# Patient Record
Sex: MALE | Race: WHITE | NOT HISPANIC OR LATINO | Employment: OTHER | URBAN - METROPOLITAN AREA
[De-identification: names, ages, dates, MRNs, and addresses within clinical notes are randomized per-mention and may not be internally consistent; named-entity substitution may affect disease eponyms.]

---

## 2024-03-23 ENCOUNTER — APPOINTMENT (OUTPATIENT)
Dept: RADIOLOGY | Facility: HOSPITAL | Age: 59
End: 2024-03-23
Payer: MEDICAID

## 2024-03-23 ENCOUNTER — HOSPITAL ENCOUNTER (EMERGENCY)
Facility: HOSPITAL | Age: 59
Discharge: HOME | End: 2024-03-23
Payer: MEDICAID

## 2024-03-23 VITALS
TEMPERATURE: 98.6 F | RESPIRATION RATE: 16 BRPM | HEART RATE: 94 BPM | SYSTOLIC BLOOD PRESSURE: 127 MMHG | OXYGEN SATURATION: 96 % | DIASTOLIC BLOOD PRESSURE: 81 MMHG

## 2024-03-23 DIAGNOSIS — R10.13 EPIGASTRIC PAIN: Primary | ICD-10-CM

## 2024-03-23 DIAGNOSIS — K29.00 ACUTE GASTRITIS WITHOUT HEMORRHAGE, UNSPECIFIED GASTRITIS TYPE: ICD-10-CM

## 2024-03-23 LAB
ALBUMIN SERPL-MCNC: 4.5 G/DL (ref 3.5–5)
ALP BLD-CCNC: 71 U/L (ref 35–125)
ALT SERPL-CCNC: 85 U/L (ref 5–40)
ANION GAP SERPL CALC-SCNC: 17 MMOL/L
APPEARANCE UR: CLEAR
AST SERPL-CCNC: 48 U/L (ref 5–40)
BASOPHILS # BLD AUTO: 0.08 X10*3/UL (ref 0–0.1)
BASOPHILS NFR BLD AUTO: 0.6 %
BILIRUB SERPL-MCNC: 0.7 MG/DL (ref 0.1–1.2)
BILIRUB UR STRIP.AUTO-MCNC: NEGATIVE MG/DL
BUN SERPL-MCNC: 12 MG/DL (ref 8–25)
CALCIUM SERPL-MCNC: 9.5 MG/DL (ref 8.5–10.4)
CHLORIDE SERPL-SCNC: 99 MMOL/L (ref 97–107)
CO2 SERPL-SCNC: 23 MMOL/L (ref 24–31)
COLOR UR: YELLOW
CREAT SERPL-MCNC: 1 MG/DL (ref 0.4–1.6)
EGFRCR SERPLBLD CKD-EPI 2021: 88 ML/MIN/1.73M*2
EOSINOPHIL # BLD AUTO: 0.12 X10*3/UL (ref 0–0.7)
EOSINOPHIL NFR BLD AUTO: 1 %
ERYTHROCYTE [DISTWIDTH] IN BLOOD BY AUTOMATED COUNT: 13.6 % (ref 11.5–14.5)
GLUCOSE SERPL-MCNC: 115 MG/DL (ref 65–99)
GLUCOSE UR STRIP.AUTO-MCNC: NORMAL MG/DL
HCT VFR BLD AUTO: 46.9 % (ref 41–52)
HGB BLD-MCNC: 16.4 G/DL (ref 13.5–17.5)
IMM GRANULOCYTES # BLD AUTO: 0.04 X10*3/UL (ref 0–0.7)
IMM GRANULOCYTES NFR BLD AUTO: 0.3 % (ref 0–0.9)
KETONES UR STRIP.AUTO-MCNC: ABNORMAL MG/DL
LEUKOCYTE ESTERASE UR QL STRIP.AUTO: NEGATIVE
LIPASE SERPL-CCNC: 31 U/L (ref 16–63)
LYMPHOCYTES # BLD AUTO: 3.84 X10*3/UL (ref 1.2–4.8)
LYMPHOCYTES NFR BLD AUTO: 30.5 %
MCH RBC QN AUTO: 31.9 PG (ref 26–34)
MCHC RBC AUTO-ENTMCNC: 35 G/DL (ref 32–36)
MCV RBC AUTO: 91 FL (ref 80–100)
MONOCYTES # BLD AUTO: 0.93 X10*3/UL (ref 0.1–1)
MONOCYTES NFR BLD AUTO: 7.4 %
MUCOUS THREADS #/AREA URNS AUTO: NORMAL /LPF
NEUTROPHILS # BLD AUTO: 7.6 X10*3/UL (ref 1.2–7.7)
NEUTROPHILS NFR BLD AUTO: 60.2 %
NITRITE UR QL STRIP.AUTO: NEGATIVE
NRBC BLD-RTO: 0 /100 WBCS (ref 0–0)
PH UR STRIP.AUTO: 6.5 [PH]
PLATELET # BLD AUTO: 314 X10*3/UL (ref 150–450)
POTASSIUM SERPL-SCNC: 4.2 MMOL/L (ref 3.4–5.1)
PROT SERPL-MCNC: 8 G/DL (ref 5.9–7.9)
PROT UR STRIP.AUTO-MCNC: ABNORMAL MG/DL
RBC # BLD AUTO: 5.14 X10*6/UL (ref 4.5–5.9)
RBC # UR STRIP.AUTO: NEGATIVE /UL
RBC #/AREA URNS AUTO: NORMAL /HPF
SODIUM SERPL-SCNC: 139 MMOL/L (ref 133–145)
SP GR UR STRIP.AUTO: 1.02
UROBILINOGEN UR STRIP.AUTO-MCNC: NORMAL MG/DL
WBC # BLD AUTO: 12.6 X10*3/UL (ref 4.4–11.3)
WBC #/AREA URNS AUTO: NORMAL /HPF

## 2024-03-23 PROCEDURE — 2550000001 HC RX 255 CONTRASTS: Performed by: PHYSICIAN ASSISTANT

## 2024-03-23 PROCEDURE — 2500000001 HC RX 250 WO HCPCS SELF ADMINISTERED DRUGS (ALT 637 FOR MEDICARE OP): Performed by: PHYSICIAN ASSISTANT

## 2024-03-23 PROCEDURE — 76705 ECHO EXAM OF ABDOMEN: CPT

## 2024-03-23 PROCEDURE — 83690 ASSAY OF LIPASE: CPT | Performed by: PHYSICIAN ASSISTANT

## 2024-03-23 PROCEDURE — 74177 CT ABD & PELVIS W/CONTRAST: CPT | Performed by: RADIOLOGY

## 2024-03-23 PROCEDURE — 80048 BASIC METABOLIC PNL TOTAL CA: CPT | Performed by: PHYSICIAN ASSISTANT

## 2024-03-23 PROCEDURE — 74177 CT ABD & PELVIS W/CONTRAST: CPT

## 2024-03-23 PROCEDURE — 2500000004 HC RX 250 GENERAL PHARMACY W/ HCPCS (ALT 636 FOR OP/ED): Performed by: PHYSICIAN ASSISTANT

## 2024-03-23 PROCEDURE — 85025 COMPLETE CBC W/AUTO DIFF WBC: CPT | Performed by: PHYSICIAN ASSISTANT

## 2024-03-23 PROCEDURE — 81001 URINALYSIS AUTO W/SCOPE: CPT | Performed by: PHYSICIAN ASSISTANT

## 2024-03-23 PROCEDURE — 96374 THER/PROPH/DIAG INJ IV PUSH: CPT | Mod: 59

## 2024-03-23 PROCEDURE — 96375 TX/PRO/DX INJ NEW DRUG ADDON: CPT

## 2024-03-23 PROCEDURE — 36415 COLL VENOUS BLD VENIPUNCTURE: CPT | Performed by: PHYSICIAN ASSISTANT

## 2024-03-23 PROCEDURE — 99285 EMERGENCY DEPT VISIT HI MDM: CPT | Mod: 25

## 2024-03-23 PROCEDURE — 76705 ECHO EXAM OF ABDOMEN: CPT | Performed by: RADIOLOGY

## 2024-03-23 RX ORDER — FAMOTIDINE 20 MG/1
20 TABLET, FILM COATED ORAL 2 TIMES DAILY
Qty: 10 TABLET | Refills: 0 | Status: SHIPPED | OUTPATIENT
Start: 2024-03-23 | End: 2024-03-28

## 2024-03-23 RX ORDER — FAMOTIDINE 10 MG/ML
20 INJECTION INTRAVENOUS ONCE
Status: COMPLETED | OUTPATIENT
Start: 2024-03-23 | End: 2024-03-23

## 2024-03-23 RX ORDER — ACETAMINOPHEN 325 MG/1
975 TABLET ORAL ONCE
Status: COMPLETED | OUTPATIENT
Start: 2024-03-23 | End: 2024-03-23

## 2024-03-23 RX ORDER — PANTOPRAZOLE SODIUM 40 MG/1
40 TABLET, DELAYED RELEASE ORAL ONCE
Status: COMPLETED | OUTPATIENT
Start: 2024-03-23 | End: 2024-03-23

## 2024-03-23 RX ORDER — OMEPRAZOLE 40 MG/1
40 CAPSULE, DELAYED RELEASE ORAL DAILY
Qty: 30 CAPSULE | Refills: 0 | Status: SHIPPED | OUTPATIENT
Start: 2024-03-23 | End: 2024-04-22

## 2024-03-23 RX ORDER — KETOROLAC TROMETHAMINE 30 MG/ML
30 INJECTION, SOLUTION INTRAMUSCULAR; INTRAVENOUS ONCE
Status: COMPLETED | OUTPATIENT
Start: 2024-03-23 | End: 2024-03-23

## 2024-03-23 RX ADMIN — KETOROLAC TROMETHAMINE 30 MG: 30 INJECTION, SOLUTION INTRAMUSCULAR at 14:50

## 2024-03-23 RX ADMIN — FAMOTIDINE 20 MG: 10 INJECTION, SOLUTION INTRAVENOUS at 14:50

## 2024-03-23 RX ADMIN — SODIUM CHLORIDE 1000 ML: 900 INJECTION, SOLUTION INTRAVENOUS at 14:46

## 2024-03-23 RX ADMIN — IOHEXOL 75 ML: 350 INJECTION, SOLUTION INTRAVENOUS at 15:27

## 2024-03-23 RX ADMIN — ACETAMINOPHEN 975 MG: 325 TABLET ORAL at 18:24

## 2024-03-23 RX ADMIN — PANTOPRAZOLE SODIUM 40 MG: 40 TABLET, DELAYED RELEASE ORAL at 18:24

## 2024-03-23 ASSESSMENT — LIFESTYLE VARIABLES
EVER FELT BAD OR GUILTY ABOUT YOUR DRINKING: NO
TOTAL SCORE: 0
EVER HAD A DRINK FIRST THING IN THE MORNING TO STEADY YOUR NERVES TO GET RID OF A HANGOVER: NO
HAVE PEOPLE ANNOYED YOU BY CRITICIZING YOUR DRINKING: NO
HAVE YOU EVER FELT YOU SHOULD CUT DOWN ON YOUR DRINKING: NO

## 2024-03-23 NOTE — ED TRIAGE NOTES
Pt came in for ruq pain x2 week.  And it radiates to his lower back. Pt has hx of neuropathy.   Pt states he has not eaten in 7 days, and has not had normal bms in 6/7 days. Pt denies n/v.  Pt is still urinating, but complaining of weakness.

## 2024-03-23 NOTE — ED PROVIDER NOTES
HPI   Chief Complaint   Patient presents with    Abdominal Pain       58-year-old male presented emergency department with a chief complaint of several weeks of an epigastric discomfort, right upper quadrant pain.  Occasionally will radiate around the right side of his back.  He states it is painful to eat and drink.  He has been taking ibuprofen daily for the last several weeks.  He is not on any acid reduction medicine.  He denies prior abdominal surgery.  He denies chest pain or shortness of breath.  He is well-appearing nontoxic afebrile.  He has some decreased urination.  He is having bowel movements.                          No data recorded                   Patient History   No past medical history on file.  No past surgical history on file.  No family history on file.  Social History     Tobacco Use    Smoking status: Not on file    Smokeless tobacco: Not on file   Substance Use Topics    Alcohol use: Not on file    Drug use: Not on file       Physical Exam   ED Triage Vitals [03/23/24 1432]   Temperature Heart Rate Respirations BP   36.2 °C (97.2 °F) 84 19 146/89      Pulse Ox Temp Source Heart Rate Source Patient Position   99 % Temporal Monitor Sitting      BP Location FiO2 (%)     Left arm --       Physical Exam  Vitals reviewed.   Constitutional:       Appearance: He is well-developed.   HENT:      Head: Normocephalic.   Cardiovascular:      Rate and Rhythm: Normal rate and regular rhythm.   Pulmonary:      Effort: Pulmonary effort is normal.   Abdominal:      Palpations: Abdomen is soft.      Comments: Mild epigastric tenderness, soft no rebound or guarding   Skin:     General: Skin is warm.   Neurological:      General: No focal deficit present.      Mental Status: He is alert.   Psychiatric:         Mood and Affect: Mood normal.         Behavior: Behavior normal.         ED Course & MDM   Diagnoses as of 03/23/24 1814   Epigastric pain   Acute gastritis without hemorrhage, unspecified gastritis type        Medical Decision Making  I have seen and evaluated this patient.  Physician available for consultation.  Vital signs have been reviewed.  All laboratory and diagnostic imaging is reviewed by myself and interpreted by myself unless otherwise stated.  Additionally imaging is interpreted by radiologist.    CBC without significant leukocytosis or anemia, metabolic panel without significant renal impairment or electrolyte abnormality.  Patient with minimal elevation of liver function test.  CT scan essentially unremarkable.  Due to his white count of 12,000 and abnormal LFTs ultrasound is obtained which shows fatty liver disease but otherwise unremarkable.  Patient has complete resolution of his symptoms after IV Pepcid.  Given his ibuprofen use and improvement of symptoms with Pepcid and lack of finding on imaging studies I highly suspect that the patient has gastritis of some degree.  Will start on omeprazole and a short course of Pepcid with gastroenterology referral.  Released in good condition.    Labs Reviewed  CBC WITH AUTO DIFFERENTIAL - Abnormal     WBC                           12.6 (*)               nRBC                          0.0                    RBC                           5.14                   Hemoglobin                    16.4                   Hematocrit                    46.9                   MCV                           91                     MCH                           31.9                   MCHC                          35.0                   RDW                           13.6                   Platelets                     314                    Neutrophils %                 60.2                   Immature Granulocytes %, Automated   0.3                    Lymphocytes %                 30.5                   Monocytes %                   7.4                    Eosinophils %                 1.0                    Basophils %                   0.6                    Neutrophils  Absolute          7.60                   Immature Granulocytes Absolute, Au*   0.04                   Lymphocytes Absolute          3.84                   Monocytes Absolute            0.93                   Eosinophils Absolute          0.12                   Basophils Absolute            0.08                COMPREHENSIVE METABOLIC PANEL - Abnormal     Glucose                       115 (*)                Sodium                        139                    Potassium                     4.2                    Chloride                      99                     Bicarbonate                   23 (*)                 Urea Nitrogen                 12                     Creatinine                    1.00                   eGFR                          88                     Calcium                       9.5                    Albumin                       4.5                    Alkaline Phosphatase          71                     Total Protein                 8.0 (*)                AST                           48 (*)                 Bilirubin, Total              0.7                    ALT                           85 (*)                 Anion Gap                     17                  URINALYSIS WITH REFLEX CULTURE AND MICROSCOPIC - Abnormal     Color, Urine                  Yellow                 Appearance, Urine             Clear                  Specific Gravity, Urine       1.025                  pH, Urine                     6.5                    Protein, Urine                20 (TRACE)                Glucose, Urine                Normal                 Blood, Urine                  NEGATIVE                Ketones, Urine                20 (1+) (*)               Bilirubin, Urine              NEGATIVE                Urobilinogen, Urine           Normal                 Nitrite, Urine                NEGATIVE                Leukocyte Esterase, Urine     NEGATIVE             LIPASE - Normal     Lipase                         31                  URINALYSIS WITH REFLEX CULTURE AND MICROSCOPIC       Narrative: The following orders were created for panel order Urinalysis with Reflex Culture and Microscopic.                Procedure                               Abnormality         Status                                   ---------                               -----------         ------                                   Urinalysis with Reflex C...[[id number]]  Abnormal            Final result                             Extra Urine Gray Tube[[id number]]                            In process                                               Please view results for these tests on the individual orders.  EXTRA URINE GRAY TUBE  URINALYSIS MICROSCOPIC WITH REFLEX CULTURE  US right upper quadrant   Final Result    Diffuse increased hepatic echogenicity suggesting hepatic steatosis.    Correlate with hepatic function studies.                Otherwise unremarkable right upper quadrant ultrasound          Signed by: Diana Stockton 3/23/2024 5:55 PM    Dictation workstation:   [id number]     CT abdomen pelvis w IV contrast   Final Result    1. No acute abnormality within the abdomen or pelvis.          MACRO:    None          Signed by: Carlos Xie 3/23/2024 4:03 PM    Dictation workstation:   [id number]     Medications  famotidine PF (Pepcid) injection 20 mg (20 mg intravenous Given 3/23/24 1450)  ketorolac (Toradol) injection 30 mg (30 mg intravenous Given 3/23/24 1450)  sodium chloride 0.9 % bolus 1,000 mL (0 mL intravenous Stopped 3/23/24 1516)   iohexol (OMNIPaque) 350 mg iodine/mL solution 75 mL (75 mL intravenous Given 3/23/24 1527)  New Prescriptions  FAMOTIDINE (PEPCID) 20 MG TABLET     Take 1 tablet (20 mg) by mouth 2 times a day for 10 doses.  OMEPRAZOLE (PRILOSEC) 40 MG DR CAPSULE     Take 1 capsule (40 mg) by mouth once daily. Do not crush or chew.            Procedure  Procedures     Jaime Hong PA-C  03/23/24 7514

## 2024-03-24 LAB — HOLD SPECIMEN: NORMAL

## 2025-04-01 ENCOUNTER — HOSPITAL ENCOUNTER (EMERGENCY)
Facility: HOSPITAL | Age: 60
Discharge: HOME | End: 2025-04-01
Payer: MEDICARE

## 2025-04-01 ENCOUNTER — APPOINTMENT (OUTPATIENT)
Dept: RADIOLOGY | Facility: HOSPITAL | Age: 60
End: 2025-04-01
Payer: MEDICARE

## 2025-04-01 VITALS
RESPIRATION RATE: 18 BRPM | TEMPERATURE: 97.7 F | HEART RATE: 65 BPM | OXYGEN SATURATION: 97 % | SYSTOLIC BLOOD PRESSURE: 131 MMHG | HEIGHT: 78 IN | WEIGHT: 315 LBS | BODY MASS INDEX: 36.45 KG/M2 | DIASTOLIC BLOOD PRESSURE: 87 MMHG

## 2025-04-01 DIAGNOSIS — M54.50 CHRONIC BILATERAL LOW BACK PAIN WITHOUT SCIATICA: Primary | ICD-10-CM

## 2025-04-01 DIAGNOSIS — G89.29 CHRONIC BILATERAL LOW BACK PAIN WITHOUT SCIATICA: Primary | ICD-10-CM

## 2025-04-01 LAB
ALBUMIN SERPL BCP-MCNC: 4.3 G/DL (ref 3.4–5)
ALP SERPL-CCNC: 48 U/L (ref 33–120)
ALT SERPL W P-5'-P-CCNC: 24 U/L (ref 10–52)
ANION GAP SERPL CALCULATED.3IONS-SCNC: 15 MMOL/L (ref 10–20)
APPEARANCE UR: CLEAR
AST SERPL W P-5'-P-CCNC: 21 U/L (ref 9–39)
BASOPHILS # BLD AUTO: 0.04 X10*3/UL (ref 0–0.1)
BASOPHILS NFR BLD AUTO: 0.4 %
BILIRUB SERPL-MCNC: 0.5 MG/DL (ref 0–1.2)
BILIRUB UR STRIP.AUTO-MCNC: NEGATIVE MG/DL
BUN SERPL-MCNC: 11 MG/DL (ref 6–23)
CALCIUM SERPL-MCNC: 8.9 MG/DL (ref 8.6–10.3)
CARDIAC TROPONIN I PNL SERPL HS: 4 NG/L (ref 0–20)
CHLORIDE SERPL-SCNC: 104 MMOL/L (ref 98–107)
CO2 SERPL-SCNC: 24 MMOL/L (ref 21–32)
COLOR UR: YELLOW
CREAT SERPL-MCNC: 1.02 MG/DL (ref 0.5–1.3)
EGFRCR SERPLBLD CKD-EPI 2021: 85 ML/MIN/1.73M*2
EOSINOPHIL # BLD AUTO: 0.18 X10*3/UL (ref 0–0.7)
EOSINOPHIL NFR BLD AUTO: 1.9 %
ERYTHROCYTE [DISTWIDTH] IN BLOOD BY AUTOMATED COUNT: 13.5 % (ref 11.5–14.5)
GLUCOSE SERPL-MCNC: 130 MG/DL (ref 74–99)
GLUCOSE UR STRIP.AUTO-MCNC: NORMAL MG/DL
HCT VFR BLD AUTO: 43.1 % (ref 41–52)
HGB BLD-MCNC: 14.8 G/DL (ref 13.5–17.5)
IMM GRANULOCYTES # BLD AUTO: 0.02 X10*3/UL (ref 0–0.7)
IMM GRANULOCYTES NFR BLD AUTO: 0.2 % (ref 0–0.9)
KETONES UR STRIP.AUTO-MCNC: NEGATIVE MG/DL
LEUKOCYTE ESTERASE UR QL STRIP.AUTO: NEGATIVE
LIPASE SERPL-CCNC: 27 U/L (ref 9–82)
LYMPHOCYTES # BLD AUTO: 3.21 X10*3/UL (ref 1.2–4.8)
LYMPHOCYTES NFR BLD AUTO: 33.5 %
MCH RBC QN AUTO: 30.6 PG (ref 26–34)
MCHC RBC AUTO-ENTMCNC: 34.3 G/DL (ref 32–36)
MCV RBC AUTO: 89 FL (ref 80–100)
MONOCYTES # BLD AUTO: 0.85 X10*3/UL (ref 0.1–1)
MONOCYTES NFR BLD AUTO: 8.9 %
MUCOUS THREADS #/AREA URNS AUTO: NORMAL /LPF
NEUTROPHILS # BLD AUTO: 5.29 X10*3/UL (ref 1.2–7.7)
NEUTROPHILS NFR BLD AUTO: 55.1 %
NITRITE UR QL STRIP.AUTO: NEGATIVE
NRBC BLD-RTO: 0 /100 WBCS (ref 0–0)
PH UR STRIP.AUTO: 5.5 [PH]
PLATELET # BLD AUTO: 269 X10*3/UL (ref 150–450)
POTASSIUM SERPL-SCNC: 3.5 MMOL/L (ref 3.5–5.3)
PROT SERPL-MCNC: 7.4 G/DL (ref 6.4–8.2)
PROT UR STRIP.AUTO-MCNC: NORMAL MG/DL
RBC # BLD AUTO: 4.84 X10*6/UL (ref 4.5–5.9)
RBC # UR STRIP.AUTO: NEGATIVE MG/DL
RBC #/AREA URNS AUTO: NORMAL /HPF
SODIUM SERPL-SCNC: 139 MMOL/L (ref 136–145)
SP GR UR STRIP.AUTO: 1.03
UROBILINOGEN UR STRIP.AUTO-MCNC: NORMAL MG/DL
WBC # BLD AUTO: 9.6 X10*3/UL (ref 4.4–11.3)
WBC #/AREA URNS AUTO: NORMAL /HPF

## 2025-04-01 PROCEDURE — 83690 ASSAY OF LIPASE: CPT

## 2025-04-01 PROCEDURE — 84484 ASSAY OF TROPONIN QUANT: CPT

## 2025-04-01 PROCEDURE — 74176 CT ABD & PELVIS W/O CONTRAST: CPT

## 2025-04-01 PROCEDURE — 85025 COMPLETE CBC W/AUTO DIFF WBC: CPT

## 2025-04-01 PROCEDURE — 81001 URINALYSIS AUTO W/SCOPE: CPT

## 2025-04-01 PROCEDURE — 96374 THER/PROPH/DIAG INJ IV PUSH: CPT

## 2025-04-01 PROCEDURE — 80053 COMPREHEN METABOLIC PANEL: CPT

## 2025-04-01 PROCEDURE — 36415 COLL VENOUS BLD VENIPUNCTURE: CPT

## 2025-04-01 PROCEDURE — 2500000004 HC RX 250 GENERAL PHARMACY W/ HCPCS (ALT 636 FOR OP/ED)

## 2025-04-01 PROCEDURE — 96361 HYDRATE IV INFUSION ADD-ON: CPT

## 2025-04-01 PROCEDURE — 74176 CT ABD & PELVIS W/O CONTRAST: CPT | Performed by: RADIOLOGY

## 2025-04-01 PROCEDURE — 99284 EMERGENCY DEPT VISIT MOD MDM: CPT | Mod: 25

## 2025-04-01 RX ORDER — KETOROLAC TROMETHAMINE 30 MG/ML
15 INJECTION, SOLUTION INTRAMUSCULAR; INTRAVENOUS ONCE
Status: COMPLETED | OUTPATIENT
Start: 2025-04-01 | End: 2025-04-01

## 2025-04-01 RX ADMIN — SODIUM CHLORIDE 1000 ML: 900 INJECTION, SOLUTION INTRAVENOUS at 17:52

## 2025-04-01 RX ADMIN — KETOROLAC TROMETHAMINE 15 MG: 30 INJECTION, SOLUTION INTRAMUSCULAR at 17:51

## 2025-04-01 ASSESSMENT — PAIN DESCRIPTION - LOCATION: LOCATION: BACK

## 2025-04-01 ASSESSMENT — COLUMBIA-SUICIDE SEVERITY RATING SCALE - C-SSRS
6. HAVE YOU EVER DONE ANYTHING, STARTED TO DO ANYTHING, OR PREPARED TO DO ANYTHING TO END YOUR LIFE?: NO
2. HAVE YOU ACTUALLY HAD ANY THOUGHTS OF KILLING YOURSELF?: NO
1. IN THE PAST MONTH, HAVE YOU WISHED YOU WERE DEAD OR WISHED YOU COULD GO TO SLEEP AND NOT WAKE UP?: NO

## 2025-04-01 ASSESSMENT — PAIN SCALES - GENERAL
PAINLEVEL_OUTOF10: 5 - MODERATE PAIN
PAINLEVEL_OUTOF10: 8

## 2025-04-01 ASSESSMENT — PAIN - FUNCTIONAL ASSESSMENT
PAIN_FUNCTIONAL_ASSESSMENT: 0-10
PAIN_FUNCTIONAL_ASSESSMENT: 0-10

## 2025-04-01 ASSESSMENT — PAIN DESCRIPTION - ORIENTATION
ORIENTATION: RIGHT;LEFT
ORIENTATION: LEFT;RIGHT

## 2025-04-01 ASSESSMENT — PAIN DESCRIPTION - DESCRIPTORS: DESCRIPTORS: STABBING

## 2025-04-01 ASSESSMENT — PAIN DESCRIPTION - PROGRESSION
CLINICAL_PROGRESSION: GRADUALLY IMPROVING
CLINICAL_PROGRESSION: NOT CHANGED

## 2025-04-01 ASSESSMENT — PAIN DESCRIPTION - PAIN TYPE
TYPE: ACUTE PAIN
TYPE: ACUTE PAIN

## 2025-04-01 ASSESSMENT — PAIN DESCRIPTION - FREQUENCY
FREQUENCY: INTERMITTENT
FREQUENCY: CONSTANT/CONTINUOUS

## 2025-04-01 ASSESSMENT — PAIN DESCRIPTION - ONSET: ONSET: SUDDEN

## 2025-04-05 NOTE — ED PROVIDER NOTES
HPI   Chief Complaint   Patient presents with    Flank Pain     For the past month I have had rt flank pain with lt flank pain it is stabbing on movement my pee is cloudy and I'm sure it is kidney stones        HPI  Patient is a 59-year-old male who presents to the ED for bilateral flank pain.  Patient states he has a history of kidney stones.  States he feels like he  has a stone.  Denies any nausea vomiting or diarrhea.  Denies any fever or chills.  Denies any hematuria or other urinary symptoms.  No other acute complaints.      Patient History   No past medical history on file.  No past surgical history on file.  No family history on file.  Social History     Tobacco Use    Smoking status: Not on file    Smokeless tobacco: Not on file   Substance Use Topics    Alcohol use: Not on file    Drug use: Not on file       Physical Exam   ED Triage Vitals [04/01/25 1717]   Temperature Heart Rate Respirations BP   36.5 °C (97.7 °F) 83 18 (!) 173/116      Pulse Ox Temp Source Heart Rate Source Patient Position   97 % Temporal Monitor Sitting      BP Location FiO2 (%)     Left arm --       Physical Exam  Vitals reviewed.   Constitutional:       General: He is not in acute distress.     Appearance: Normal appearance. He is obese. He is not ill-appearing.   HENT:      Head: Normocephalic and atraumatic.   Eyes:      Extraocular Movements: Extraocular movements intact.   Cardiovascular:      Rate and Rhythm: Normal rate and regular rhythm.      Heart sounds: Normal heart sounds.   Pulmonary:      Effort: Pulmonary effort is normal.      Breath sounds: Normal breath sounds.   Abdominal:      General: Abdomen is flat. There is no distension.      Palpations: Abdomen is soft.      Tenderness: There is no abdominal tenderness.   Musculoskeletal:         General: Normal range of motion.      Cervical back: Normal range of motion and neck supple.   Skin:     General: Skin is warm and dry.   Neurological:      General: No focal  deficit present.      Mental Status: He is alert and oriented to person, place, and time.   Psychiatric:         Mood and Affect: Mood normal.         Behavior: Behavior normal.           ED Course & MDM   Diagnoses as of 04/05/25 1354   Chronic bilateral low back pain without sciatica                 No data recorded     Vitor Coma Scale Score: 15 (04/01/25 1729 : Silvestre Ferreira RN)                           Medical Decision Making  Parts of this chart have been completed using voice recognition software. Please excuse any errors of transcription.  My thought process and reason for plan has been formulated from the time that I saw the patient until the time of disposition and is not specific to one specific moment during their visit and furthermore my MDM encompasses this entire chart and not only this text box.    HPI:   A medically appropriate HPI was obtained, outlined above.    Bassem Peacock is a  59 y.o. male    Chief Complaint   Patient presents with    Flank Pain     For the past month I have had rt flank pain with lt flank pain it is stabbing on movement my pee is cloudy and I'm sure it is kidney stones        No past medical history on file.    No past surgical history on file.         No family history on file.    Allergies   Allergen Reactions    Naproxen Anaphylaxis       Current Outpatient Medications   Medication Instructions    famotidine (PEPCID) 20 mg, oral, 2 times daily    omeprazole (PRILOSEC) 40 mg, oral, Daily, Do not crush or chew.   for details    Exam:   No data found.    A medically appropriate exam performed, outlined above, given the known history and presentation.    EKG/Cardiac monitor:   If EKG was done and, it was interpreted by attending physician, see their note for ED course for more detail.    Medications given during visit:  Medications   sodium chloride 0.9 % bolus 1,000 mL (0 mL intravenous Stopped 4/1/25 1936)   ketorolac (Toradol) injection 15 mg (15 mg intravenous Given  4/1/25 2849)        Diagnostic/tests:  Labs Reviewed   COMPREHENSIVE METABOLIC PANEL - Abnormal       Result Value    Glucose 130 (*)     Sodium 139      Potassium 3.5      Chloride 104      Bicarbonate 24      Anion Gap 15      Urea Nitrogen 11      Creatinine 1.02      eGFR 85      Calcium 8.9      Albumin 4.3      Alkaline Phosphatase 48      Total Protein 7.4      AST 21      Bilirubin, Total 0.5      ALT 24     URINALYSIS WITH REFLEX CULTURE AND MICROSCOPIC - Normal    Color, Urine Yellow      Appearance, Urine Clear      Specific Gravity, Urine 1.027      pH, Urine 5.5      Protein, Urine 10 (TRACE)      Glucose, Urine Normal      Blood, Urine NEGATIVE      Ketones, Urine NEGATIVE      Bilirubin, Urine NEGATIVE      Urobilinogen, Urine Normal      Nitrite, Urine NEGATIVE      Leukocyte Esterase, Urine NEGATIVE     LIPASE - Normal    Lipase 27      Narrative:     Venipuncture immediately after or during the administration of Metamizole may lead to falsely low results. Testing should be performed immediately prior to Metamizole dosing.   TROPONIN I, HIGH SENSITIVITY - Normal    Troponin I, High Sensitivity 4      Narrative:     Less than 99th percentile of normal range cutoff-  Female and children under 18 years old <14 ng/L; Male <21 ng/L: Negative  Repeat testing should be performed if clinically indicated.     Female and children under 18 years old 14-50 ng/L; Male 21-50 ng/L:  Consistent with possible cardiac damage and possible increased clinical   risk. Serial measurements may help to assess extent of myocardial damage.     >50 ng/L: Consistent with cardiac damage, increased clinical risk and  myocardial infarction. Serial measurements may help assess extent of   myocardial damage.      NOTE: Children less than 1 year old may have higher baseline troponin   levels and results should be interpreted in conjunction with the overall   clinical context.     NOTE: Troponin I testing is performed using a  different   testing methodology at [facility] than at other   [facility]. Direct result comparisons should only   be made within the same method.   CBC WITH AUTO DIFFERENTIAL    WBC 9.6      nRBC 0.0      RBC 4.84      Hemoglobin 14.8      Hematocrit 43.1      MCV 89      MCH 30.6      MCHC 34.3      RDW 13.5      Platelets 269      Neutrophils % 55.1      Immature Granulocytes %, Automated 0.2      Lymphocytes % 33.5      Monocytes % 8.9      Eosinophils % 1.9      Basophils % 0.4      Neutrophils Absolute 5.29      Immature Granulocytes Absolute, Automated 0.02      Lymphocytes Absolute 3.21      Monocytes Absolute 0.85      Eosinophils Absolute 0.18      Basophils Absolute 0.04     URINALYSIS WITH REFLEX CULTURE AND MICROSCOPIC    Narrative:     The following orders were created for panel order Urinalysis with Reflex Culture and Microscopic.  Procedure                               Abnormality         Status                     ---------                               -----------         ------                     Urinalysis with Reflex C...[[id number]]  Normal              Final result               Extra Urine Gray Tube[[id number]]                                                         Please view results for these tests on the individual orders.   EXTRA URINE GRAY TUBE   URINALYSIS MICROSCOPIC WITH REFLEX CULTURE    WBC, Urine 1-5      RBC, Urine 1-2      Mucus, Urine 1+          CT abdomen pelvis wo IV contrast   Final Result   No CT evidence for acute pathology within the abdomen or pelvis.        Signed by: Campbell Corrales 4/1/2025 6:15 PM   Dictation workstation:   [id number]             [facility] Summary:  Suspect musculoskeletal back pain.  Workup today is completely unremarkable.  Patient stable for discharge at this time.    We have discussed the diagnosis and risks, and we agree with discharging home to follow-up with appropriate physician as directed. We also discussed returning to the  Emergency Department immediately if new or worsening symptoms occur. We have discussed the symptoms which are most concerning that necessitate immediate return. Pt symptoms have been well controlled here and the patient is safe for discharge with appropriate outpatient follow up. The patient has verbalized understanding to return to ER without delay for new or worsening pains or for any other symptoms or concerns. I utilized the discharge clinical management tool provided [employer] to help estimate risk of negative outcome for this patient.      Disposition:  ED Prescriptions    None           Procedure  Procedures     Carlos Mcbride PA-C  04/05/25 0520

## 2025-04-21 ENCOUNTER — OFFICE VISIT (OUTPATIENT)
Dept: PRIMARY CARE | Facility: CLINIC | Age: 60
End: 2025-04-21
Payer: MEDICARE

## 2025-04-21 VITALS
BODY MASS INDEX: 36.45 KG/M2 | OXYGEN SATURATION: 98 % | SYSTOLIC BLOOD PRESSURE: 130 MMHG | HEIGHT: 78 IN | TEMPERATURE: 97.1 F | DIASTOLIC BLOOD PRESSURE: 78 MMHG | WEIGHT: 315 LBS | HEART RATE: 93 BPM

## 2025-04-21 DIAGNOSIS — R21 RASH: ICD-10-CM

## 2025-04-21 DIAGNOSIS — M54.9 SPINAL PAIN: ICD-10-CM

## 2025-04-21 DIAGNOSIS — Z00.00 ANNUAL PHYSICAL EXAM: ICD-10-CM

## 2025-04-21 DIAGNOSIS — M79.2 NEUROPATHIC PAIN: ICD-10-CM

## 2025-04-21 DIAGNOSIS — Z13.220 SCREENING FOR HYPERLIPIDEMIA: ICD-10-CM

## 2025-04-21 DIAGNOSIS — R63.5 WEIGHT GAIN: ICD-10-CM

## 2025-04-21 DIAGNOSIS — Z00.00 MEDICARE ANNUAL WELLNESS VISIT, SUBSEQUENT: ICD-10-CM

## 2025-04-21 DIAGNOSIS — Z12.5 SCREENING FOR PROSTATE CANCER: ICD-10-CM

## 2025-04-21 DIAGNOSIS — Z11.4 SCREENING FOR HIV (HUMAN IMMUNODEFICIENCY VIRUS): ICD-10-CM

## 2025-04-21 DIAGNOSIS — Z11.59 NEED FOR HEPATITIS C SCREENING TEST: ICD-10-CM

## 2025-04-21 DIAGNOSIS — K21.9 GASTROESOPHAGEAL REFLUX DISEASE, UNSPECIFIED WHETHER ESOPHAGITIS PRESENT: ICD-10-CM

## 2025-04-21 DIAGNOSIS — R73.09 ELEVATED GLUCOSE: ICD-10-CM

## 2025-04-21 DIAGNOSIS — R10.84 GENERALIZED ABDOMINAL PAIN: Primary | ICD-10-CM

## 2025-04-21 PROCEDURE — 99215 OFFICE O/P EST HI 40 MIN: CPT | Mod: 25 | Performed by: PHYSICIAN ASSISTANT

## 2025-04-21 PROCEDURE — G2211 COMPLEX E/M VISIT ADD ON: HCPCS | Performed by: PHYSICIAN ASSISTANT

## 2025-04-21 PROCEDURE — 1036F TOBACCO NON-USER: CPT | Performed by: PHYSICIAN ASSISTANT

## 2025-04-21 PROCEDURE — 99204 OFFICE O/P NEW MOD 45 MIN: CPT | Performed by: PHYSICIAN ASSISTANT

## 2025-04-21 PROCEDURE — 3008F BODY MASS INDEX DOCD: CPT | Performed by: PHYSICIAN ASSISTANT

## 2025-04-21 PROCEDURE — 90715 TDAP VACCINE 7 YRS/> IM: CPT | Performed by: PHYSICIAN ASSISTANT

## 2025-04-21 PROCEDURE — G0009 ADMIN PNEUMOCOCCAL VACCINE: HCPCS | Performed by: PHYSICIAN ASSISTANT

## 2025-04-21 PROCEDURE — G0439 PPPS, SUBSEQ VISIT: HCPCS | Performed by: PHYSICIAN ASSISTANT

## 2025-04-21 PROCEDURE — 99214 OFFICE O/P EST MOD 30 MIN: CPT | Mod: 25 | Performed by: PHYSICIAN ASSISTANT

## 2025-04-21 RX ORDER — DULOXETIN HYDROCHLORIDE 20 MG/1
20 CAPSULE, DELAYED RELEASE ORAL DAILY
Qty: 30 CAPSULE | Refills: 1 | Status: SHIPPED | OUTPATIENT
Start: 2025-04-21 | End: 2025-06-20

## 2025-04-21 RX ORDER — DESONIDE 0.5 MG/G
CREAM TOPICAL 2 TIMES DAILY
Qty: 15 G | Refills: 1 | Status: SHIPPED | OUTPATIENT
Start: 2025-04-21 | End: 2026-04-21

## 2025-04-21 RX ORDER — PANTOPRAZOLE SODIUM 40 MG/1
40 TABLET, DELAYED RELEASE ORAL DAILY
Qty: 30 TABLET | Refills: 1 | Status: SHIPPED | OUTPATIENT
Start: 2025-04-21 | End: 2025-06-20

## 2025-04-21 ASSESSMENT — PATIENT HEALTH QUESTIONNAIRE - PHQ9
SUM OF ALL RESPONSES TO PHQ9 QUESTIONS 1 AND 2: 0
2. FEELING DOWN, DEPRESSED OR HOPELESS: NOT AT ALL
1. LITTLE INTEREST OR PLEASURE IN DOING THINGS: NOT AT ALL

## 2025-04-21 ASSESSMENT — PAIN SCALES - GENERAL: PAINLEVEL_OUTOF10: 4

## 2025-04-21 NOTE — PROGRESS NOTES
MEDICARE WELLNESS    Chief Complaint   Patient presents with    back pains     Back pains - went to ER last week to rule out kidney stones was told there were none - needs referred for cscope - chiropractor helps with back pain       HPI:  Bassem Peacock is a 59 y.o. male here  for a a Medicare Wellness Visit and to discuss chronic health issues    Patient reports chronic neuropathy of the lower extremities.  He reports has been having chronic low back pain and thoracic pain for quite some time.  He went to the emergency department recently for concerns of gallbladder/GI issues.  CT scan was negative for acute abdominal issues however did note moderate to severe spinal arthritis.  Pancreatitis was ruled out. He has not had dedicated view of his lumbar thoracic or C-spine.  We will obtain x-rays of this.  He does report chronic GERD that he takes omeprazole for for the past year.  Given age and comorbidities, will order colonoscopy and EGD.  No family history of colon cancer but does admit to family history of melanoma.  Will stop omeprazole and send in Protonix. Low suspicion of ACS/TAA/PE/Pneumothorax/cholecystitis/ pancreatitis based on ER workup.    Patient is also requesting desonide for intermittent rash.  He reports he has tried other medications such as triamcinolone, hydrocortisone, E TC.  I discussed the risks of using steroid creams on the face, groin, axilla versus remainder of the body.  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    Patient reports intended weight gain, will check thyroid.  Low suspicion of CHF, he denies any shortness of breath, chest pain, dyspnea exertion or palpitations.  Denies any lower extremity edema.    Health Maintenance    Other Health Care Providers:  Medical record reviewed and discussed with patient.  Chiropractor. No other  providers    Social History:  Tobacco:Denies  EtOH: Denies  Patient reports routine vision checks and dental cleanings, and regular exercise with walking/biking.     Family History:  Family History[1]  Melanoma    Advanced Directives:  Patient does not have advanced directives in place.  Counseled and discussed importance with patient during visit today.  Provided assistance as necessary.    Opioid Medications:  Does the patient use opioid medications: NO  Name of medication: N/A  If yes, do they take this medicine appropriately: N/A    Overall Health:  How does the patient rate their health status today:  GOOD    Cognitive Screen:  AAAx3  to person, place and time: YES  3 word recall: Banana, Chair, Sunrise  - Immediate recall: YES  - 5 minutes recall: YES  Impression: No cognitive deficiency observed during screening or encounter today     Vision/Hearing Screen:  Vision:  No acute vision concerns at visit today.  Hearing screen: reports no difficulty with hearing and passes finger rub test bilaterally    Immunizations:  COVID:  Defer to respiratory season  Flu:  Defer  Tdap: Today  Pneumonia:  Today  Shingrix:  Next appointment    Immunization History   Administered Date(s) Administered    Hepatitis B vaccine, adult *Check Product/Dose* 10/25/2001, 11/27/2001, 04/24/2002    Moderna SARS-CoV-2 Vaccination 01/11/2022, 08/24/2022    Td (adult), unspecified 09/10/2001       Screenings:  HCV: Ordered  PSA: Ordered  Colonoscopy:  Ordered  AAA:  Defer for the time being, will address after EGD/colonoscopy  Lung:  NA    Reviewed:   Past Medical History/Allergies:  Yes  Family History:  Yes  Social History:  Yes  Current Medications:  Yes  Vital Signs:  Yes  Advanced Directives:  discussed  Immunizations:  reviewed today  Home Safety:                    Up & Go test > 30 seconds?  No                   Home have rugs; lack grab bars in bathroom; lack handrail on stairs; have poor lighting?  No                   Hearing  difficulties?  No  Geriatric Assessment           ADL areas requiring assistance:  Does not need help with , Dressing, Eating, Ambulating, Toileting, Grooming, Hygiene.            IADL areas requiring assistance:  Does not need help with , Shopping, Housework, Accounting, Transportation, Driving.   Medications reviewed           Current supplements  Reviewed and recorded.     PHQ9/GAD7:         Current Medications  Current Outpatient Medications   Medication Instructions    DULoxetine (CYMBALTA) 20 mg, oral, Daily, Do not crush or chew.    omeprazole (PRILOSEC) 40 mg, oral, Daily, Do not crush or chew.    pantoprazole (PROTONIX) 40 mg, oral, Daily, Do not crush, chew, or split.        History  Allergies[2]   Medical History[3]   Surgical History[4]  Family History[5]  Social History[6]  Tobacco Use: Low Risk  (4/21/2025)    Patient History     Smoking Tobacco Use: Never     Smokeless Tobacco Use: Never     Passive Exposure: Not on file        ROS  All pertinent positive symptoms are included in the history of present illness.  All other systems have been reviewed and are negative and noncontributory to this patient's current ailments.    VITAL SIGNS  Vitals:    04/21/25 1346   BP: 130/78   Pulse: 93   Temp: 36.2 °C (97.1 °F)   SpO2: 98%     Vitals:    04/21/25 1346   Weight: 145 kg (320 lb)      Body mass index is 36.98 kg/m².     PHYSICAL EXAM    GENERAL  Well-appearing, pleasant and cooperative.  No acute distress. Obese    HEENT  HEAD:   Normocephalic.  Atraumatic.  EYES:  PERRLA.  No scleral icterus or conjunctival injection.  EARS:  Tympanic membranes visualized bilaterally without erythema, fluid, or bulging.  NECK:  No adenopathy.  No palpable thyroid enlargement or nodules.    THROAT:  Moist oropharynx without tonsillar enlargement or exudates.    LUNGS:    Clear to auscultation bilaterally.  No wheezes, rales, rhonchi.    CARDIAC:  Regular rate and rhythm.  Normal S1S2.  No  murmurs/rubs/gallops.    ABDOMEN:  Soft, non-tender, non-distended.  No hepatosplenomegaly.  Normoactive bowel sounds.    MUSCULOSKELETAL:  No gross abnormalities.   No joint swelling or erythema. + SLR supine.     EXTREMITIES:  No LE edema or cyanosis.      NEURO           Alert and oriented x3. No focal deficits.    PSYCH:          Affect appropriate.     CBC:   Lab Results   Component Value Date    WBC 9.6 04/01/2025    RBC 4.84 04/01/2025    HGB 14.8 04/01/2025    HCT 43.1 04/01/2025    MCV 89 04/01/2025    MCH 30.6 04/01/2025    MCHC 34.3 04/01/2025    RDWS 46.9 01/26/2023    RDWC 14.4 01/26/2023     04/01/2025    MPV 10.9 01/26/2023       CBC w/Diff:   Lab Results   Component Value Date    WBC 9.6 04/01/2025    NRBC 0.0 04/01/2025    RBC 4.84 04/01/2025    HGB 14.8 04/01/2025    HCT 43.1 04/01/2025    MCV 89 04/01/2025    MCHC 34.3 04/01/2025     04/01/2025    RDW 13.5 04/01/2025    NEUTOPHILPCT 55.1 04/01/2025    IGPCT 0.2 04/01/2025    LYMPHOPCT 33.5 04/01/2025    MONOPCT 8.9 04/01/2025    EOSPCT 1.9 04/01/2025    BASOPCT 0.4 04/01/2025    NEUTROABS 5.29 04/01/2025    LYMPHSABS 3.21 04/01/2025    MONOSABS 0.85 04/01/2025    EOSABS 0.18 04/01/2025    BASOSABS 0.04 04/01/2025        CMP:  Lab Results   Component Value Date    GLUCOSE 130 (H) 04/01/2025     04/01/2025    K 3.5 04/01/2025     04/01/2025    CO2 24 04/01/2025    ANIONGAP 15 04/01/2025    BUN 11 04/01/2025    CREATININE 1.02 04/01/2025    CALCIUM 8.9 04/01/2025    ALBUMIN 4.3 04/01/2025    ALKPHOS 48 04/01/2025    PROT 7.4 04/01/2025    AST 21 04/01/2025    BILITOT 0.5 04/01/2025    ALT 24 04/01/2025        BMP:  Lab Results   Component Value Date    GLUCOSE 130 (H) 04/01/2025    BUN 11 04/01/2025    CREATININE 1.02 04/01/2025    UREACREAUR 11.0 01/26/2023     04/01/2025    K 3.5 04/01/2025     04/01/2025    CO2 24 04/01/2025    ANIONGAP 15 04/01/2025    CALCIUM 8.9 04/01/2025    EGFR 85 04/01/2025     "    Lipid:   Lab Results   Component Value Date    CHOL 176 01/26/2023    HDL 38 (L) 01/26/2023    CHHDL 4.6 01/26/2023    LDLCALC 98 01/26/2023    TRIG 200 (H) 01/26/2023       LDL Direct:  No results found for: \"LDLDIRECT\"     TSH:   Lab Results   Component Value Date    TSH 2.36 01/26/2023        B12:  No results found for: \"OWREWPFH41\"    Vitamin D:  No results found for: \"VITD25\"     HgA1c:   Lab Results   Component Value Date    HGBA1C 6.3 (H) 01/26/2023       PSA:   No results found for: \"PSA\"    FreeT4:  No results found for: \"FREET4\"    T3:   No results found for: \"T3FREE\"                 CT Abdomen/Pelvis  Exam Information    Status Exam Begun Exam Ended   Final 4/01/2025 17:28 4/01/2025 17:38     Study Result    Narrative & Impression   Interpreted By:  Campbell Corrales,   STUDY:  CT ABDOMEN PELVIS WO IV CONTRAST; 4/1/2025 5:38 pm      INDICATION:  Signs/Symptoms:Bilateral flank pain, cloudy urine;      COMPARISON:  03/23/2024      ACCESSION NUMBER(S):  [id number]      ORDERING CLINICIAN:  MAYRA VERDIN      TECHNIQUE:  Contiguous axial images from the lung bases through the abdomen and  pelvis were performed. Additionally, coronal and sagittal  reconstructed images were obtained. All CT examinations are performed  with 1 or more of the following dose reduction techniques: Automated  exposure control, adjustment of mA and/or kv according to patient's  size, or use of iterative reconstruction techniques.          FINDINGS:  Evaluation of the solid viscera is suboptimal due to the lack of  intravenous contrast.      Limited images of the lower thorax: No pleural or pericardial  effusion.      The liver, gallbladder,  common bile duct, pancreas, spleen, and  adrenal glands are unremarkable.      Kidneys: No calculi nor hydronephrosis bilaterally.      Urinary bladder: Minimally distended and otherwise unremarkable.      The visualized aorta is unremarkable.      The bowel is nondistended without evidence " for acute pathology.  Appendix is within normal limits. Colon is unremarkable with no acute  inflammatory process. Only minimal stool burden is seen.      No ascites or free intraperitoneal air within the abdomen or pelvis  is seen.      The visualized osseous structures are intact. Moderate to  moderate-severe degenerative changes of the hip joints.          IMPRESSION:  No CT evidence for acute pathology within the abdomen or pelvis.     Preventative Services reviewed with patient, instructions provided    Assessment/Plan   Problem List Items Addressed This Visit    None  Visit Diagnoses         Generalized abdominal pain    -  Primary    Relevant Medications    pantoprazole (ProtoNix) 40 mg EC tablet    Other Relevant Orders    Colonoscopy Screening; Average Risk Patient    Esophagogastroduodenoscopy (EGD)    Tsh With Reflex To Free T4 If Abnormal    Urinalysis with Reflex Culture and Microscopic    Hepatitis panel, acute        Elevated glucose        Relevant Orders    Hemoglobin A1c      Rash        Relevant Medications    desonide (DesOwen) 0.05 % cream      Spinal pain        Relevant Orders    XR lumbar spine 2-3 views    XR thoracic spine 3 views    XR cervical spine 2-3 views      Neuropathic pain        Relevant Medications    DULoxetine (Cymbalta) 20 mg DR capsule      Weight gain        Relevant Orders    Tsh With Reflex To Free T4 If Abnormal      Screening for HIV (human immunodeficiency virus)        Relevant Orders    HIV 1/2 Antigen/Antibody Screen with Reflex to Confirmation      Annual physical exam          Need for hepatitis C screening test        Relevant Orders    Hepatitis C antibody      Gastroesophageal reflux disease, unspecified whether esophagitis present        Relevant Medications    pantoprazole (ProtoNix) 40 mg EC tablet      Screening for hyperlipidemia        Relevant Orders    Lipid panel      Screening for prostate cancer        Relevant Orders    Prostate Spec.Ag,Screen       Medicare annual wellness visit, subsequent                   Schedule x-rays  Schedule fasting labs  Schedule colonoscopy/EGD  Return to clinic in 1 month    If symptoms severely worsen or you experience any new concerning symptoms, go to the nearest emergency room or call 911 as needed.    Jarrett Quarles PA-C          [1] No family history on file.  [2]   Allergies  Allergen Reactions    Naproxen Anaphylaxis    Naproxen-Diphenhydramine Anaphylaxis    Naproxen-Pseudoephedrine Anaphylaxis   [3] History reviewed. No pertinent past medical history.  [4] History reviewed. No pertinent surgical history.  [5] No family history on file.  [6]   Social History  Tobacco Use    Smoking status: Never    Smokeless tobacco: Never   Substance Use Topics    Alcohol use: Never    Drug use: Yes     Types: Marijuana

## 2025-04-21 NOTE — PATIENT INSTRUCTIONS
Schedule x-rays  Schedule fasting labs  Schedule colonoscopy/EGD  Return to clinic in 1 month    If symptoms severely worsen or you experience any new concerning symptoms, go to the nearest emergency room or call 911 as needed.

## 2025-04-23 DIAGNOSIS — Z12.11 COLON CANCER SCREENING: Primary | ICD-10-CM

## 2025-04-23 RX ORDER — SIMETHICONE 125 MG
125 TABLET,CHEWABLE ORAL
Qty: 2 TABLET | Refills: 0 | Status: SHIPPED | OUTPATIENT
Start: 2025-04-23

## 2025-04-23 RX ORDER — POLYETHYLENE GLYCOL 3350, SODIUM SULFATE ANHYDROUS, SODIUM BICARBONATE, SODIUM CHLORIDE, POTASSIUM CHLORIDE 236; 22.74; 6.74; 5.86; 2.97 G/4L; G/4L; G/4L; G/4L; G/4L
4 POWDER, FOR SOLUTION ORAL ONCE
Qty: 4000 ML | Refills: 0 | Status: SHIPPED | OUTPATIENT
Start: 2025-04-23 | End: 2025-04-23

## 2025-04-25 ENCOUNTER — HOSPITAL ENCOUNTER (OUTPATIENT)
Dept: RADIOLOGY | Facility: HOSPITAL | Age: 60
Discharge: HOME | End: 2025-04-25
Payer: MEDICARE

## 2025-04-25 DIAGNOSIS — M54.9 SPINAL PAIN: ICD-10-CM

## 2025-04-25 PROCEDURE — 72050 X-RAY EXAM NECK SPINE 4/5VWS: CPT

## 2025-04-25 PROCEDURE — 72072 X-RAY EXAM THORAC SPINE 3VWS: CPT

## 2025-04-25 PROCEDURE — 72110 X-RAY EXAM L-2 SPINE 4/>VWS: CPT

## 2025-04-26 LAB
CHOLEST SERPL-MCNC: 142 MG/DL
CHOLEST/HDLC SERPL: 3.8 (CALC)
EST. AVERAGE GLUCOSE BLD GHB EST-MCNC: 143 MG/DL
EST. AVERAGE GLUCOSE BLD GHB EST-SCNC: 7.9 MMOL/L
HAV IGM SERPL QL IA: NORMAL
HBA1C MFR BLD: 6.6 %
HBV CORE IGM SERPL QL IA: NORMAL
HBV SURFACE AG SERPL QL IA: NORMAL
HCV AB SERPL QL IA: NORMAL
HDLC SERPL-MCNC: 37 MG/DL
HIV 1+2 AB+HIV1 P24 AG SERPL QL IA: NORMAL
LDLC SERPL CALC-MCNC: 86 MG/DL (CALC)
NONHDLC SERPL-MCNC: 105 MG/DL (CALC)
PSA SERPL-MCNC: 0.35 NG/ML
TRIGL SERPL-MCNC: 101 MG/DL
TSH SERPL-ACNC: 1.71 MIU/L (ref 0.4–4.5)

## 2025-04-28 DIAGNOSIS — M47.812 OSTEOARTHRITIS OF CERVICAL SPINE, UNSPECIFIED SPINAL OSTEOARTHRITIS COMPLICATION STATUS: ICD-10-CM

## 2025-04-28 DIAGNOSIS — M47.816 ARTHRITIS OF LUMBAR SPINE: Primary | ICD-10-CM

## 2025-04-28 DIAGNOSIS — M47.814 OSTEOARTHRITIS OF THORACIC SPINE, UNSPECIFIED SPINAL OSTEOARTHRITIS COMPLICATION STATUS: ICD-10-CM

## 2025-04-28 DIAGNOSIS — E11.9 TYPE 2 DIABETES MELLITUS WITHOUT COMPLICATION, WITHOUT LONG-TERM CURRENT USE OF INSULIN: ICD-10-CM

## 2025-05-02 DIAGNOSIS — E78.5 HYPERLIPIDEMIA, UNSPECIFIED HYPERLIPIDEMIA TYPE: ICD-10-CM

## 2025-05-02 DIAGNOSIS — E11.9 TYPE 2 DIABETES MELLITUS WITHOUT COMPLICATION, WITHOUT LONG-TERM CURRENT USE OF INSULIN: Primary | ICD-10-CM

## 2025-05-02 LAB
APPEARANCE UR: CLEAR
BACTERIA #/AREA URNS HPF: ABNORMAL /HPF
BACTERIA UR CULT: ABNORMAL
BILIRUB UR QL STRIP: ABNORMAL
COLOR UR: ABNORMAL
GLUCOSE UR QL STRIP: NEGATIVE
HGB UR QL STRIP: NEGATIVE
HYALINE CASTS #/AREA URNS LPF: ABNORMAL /LPF
KETONES UR QL STRIP: ABNORMAL
LEUKOCYTE ESTERASE UR QL STRIP: ABNORMAL
NITRITE UR QL STRIP: NEGATIVE
PH UR STRIP: ABNORMAL [PH] (ref 5–8)
PROT UR QL STRIP: ABNORMAL
RBC #/AREA URNS HPF: ABNORMAL /HPF
SERVICE CMNT-IMP: ABNORMAL
SP GR UR STRIP: 1.04 (ref 1–1.03)
SQUAMOUS #/AREA URNS HPF: ABNORMAL /HPF
WBC #/AREA URNS HPF: ABNORMAL /HPF

## 2025-05-02 RX ORDER — GLIPIZIDE 2.5 MG/1
2.5 TABLET, EXTENDED RELEASE ORAL DAILY
Qty: 30 TABLET | Refills: 11 | Status: SHIPPED | OUTPATIENT
Start: 2025-05-02 | End: 2026-05-02

## 2025-05-02 RX ORDER — ROSUVASTATIN CALCIUM 10 MG/1
10 TABLET, COATED ORAL DAILY
Qty: 100 TABLET | Refills: 3 | Status: SHIPPED | OUTPATIENT
Start: 2025-05-02 | End: 2026-06-06

## 2025-05-23 ENCOUNTER — OFFICE VISIT (OUTPATIENT)
Dept: PRIMARY CARE | Facility: CLINIC | Age: 60
End: 2025-05-23
Payer: MEDICARE

## 2025-05-23 VITALS
HEIGHT: 78 IN | WEIGHT: 309 LBS | DIASTOLIC BLOOD PRESSURE: 80 MMHG | BODY MASS INDEX: 35.75 KG/M2 | SYSTOLIC BLOOD PRESSURE: 122 MMHG | TEMPERATURE: 97.7 F | HEART RATE: 82 BPM | OXYGEN SATURATION: 98 %

## 2025-05-23 DIAGNOSIS — M54.9 SPINAL PAIN: ICD-10-CM

## 2025-05-23 DIAGNOSIS — M79.2 NEUROPATHIC PAIN: ICD-10-CM

## 2025-05-23 DIAGNOSIS — M51.369 DEGENERATION OF INTERVERTEBRAL DISC OF LUMBAR REGION, UNSPECIFIED WHETHER PAIN PRESENT: ICD-10-CM

## 2025-05-23 DIAGNOSIS — R39.89 DARK YELLOW-COLORED URINE: ICD-10-CM

## 2025-05-23 DIAGNOSIS — G89.29 CHRONIC LOW BACK PAIN, UNSPECIFIED BACK PAIN LATERALITY, UNSPECIFIED WHETHER SCIATICA PRESENT: ICD-10-CM

## 2025-05-23 DIAGNOSIS — M54.50 CHRONIC LOW BACK PAIN, UNSPECIFIED BACK PAIN LATERALITY, UNSPECIFIED WHETHER SCIATICA PRESENT: ICD-10-CM

## 2025-05-23 DIAGNOSIS — E11.9 TYPE 2 DIABETES MELLITUS WITHOUT COMPLICATION, WITHOUT LONG-TERM CURRENT USE OF INSULIN: Primary | ICD-10-CM

## 2025-05-23 DIAGNOSIS — K21.9 GASTROESOPHAGEAL REFLUX DISEASE, UNSPECIFIED WHETHER ESOPHAGITIS PRESENT: ICD-10-CM

## 2025-05-23 DIAGNOSIS — M54.9 BILATERAL BACK PAIN, UNSPECIFIED BACK LOCATION, UNSPECIFIED CHRONICITY: ICD-10-CM

## 2025-05-23 LAB
POC APPEARANCE, URINE: CLEAR
POC BILIRUBIN, URINE: NEGATIVE
POC BLOOD, URINE: NEGATIVE
POC COLOR, URINE: YELLOW
POC GLUCOSE, URINE: NEGATIVE MG/DL
POC KETONES, URINE: NEGATIVE MG/DL
POC LEUKOCYTES, URINE: NEGATIVE
POC NITRITE,URINE: NEGATIVE
POC PH, URINE: 5.5 PH
POC PROTEIN, URINE: ABNORMAL MG/DL
POC SPECIFIC GRAVITY, URINE: >=1.03
POC UROBILINOGEN, URINE: 0.2 EU/DL

## 2025-05-23 PROCEDURE — 1036F TOBACCO NON-USER: CPT | Performed by: PHYSICIAN ASSISTANT

## 2025-05-23 PROCEDURE — 99214 OFFICE O/P EST MOD 30 MIN: CPT | Performed by: PHYSICIAN ASSISTANT

## 2025-05-23 PROCEDURE — 3079F DIAST BP 80-89 MM HG: CPT | Performed by: PHYSICIAN ASSISTANT

## 2025-05-23 PROCEDURE — 81003 URINALYSIS AUTO W/O SCOPE: CPT | Mod: QW | Performed by: PHYSICIAN ASSISTANT

## 2025-05-23 PROCEDURE — 3008F BODY MASS INDEX DOCD: CPT | Performed by: PHYSICIAN ASSISTANT

## 2025-05-23 PROCEDURE — 3074F SYST BP LT 130 MM HG: CPT | Performed by: PHYSICIAN ASSISTANT

## 2025-05-23 PROCEDURE — G2211 COMPLEX E/M VISIT ADD ON: HCPCS | Performed by: PHYSICIAN ASSISTANT

## 2025-05-23 RX ORDER — DULOXETIN HYDROCHLORIDE 20 MG/1
20 CAPSULE, DELAYED RELEASE ORAL DAILY
Qty: 90 CAPSULE | Refills: 1 | Status: SHIPPED | OUTPATIENT
Start: 2025-05-23 | End: 2025-11-19

## 2025-05-23 RX ORDER — GLIPIZIDE 2.5 MG/1
2.5 TABLET, EXTENDED RELEASE ORAL DAILY
Qty: 90 TABLET | Refills: 3 | Status: SHIPPED | OUTPATIENT
Start: 2025-05-23 | End: 2026-05-23

## 2025-05-23 RX ORDER — PRAVASTATIN SODIUM 10 MG/1
10 TABLET ORAL DAILY
Qty: 90 TABLET | Refills: 3 | Status: SHIPPED | OUTPATIENT
Start: 2025-05-23 | End: 2026-05-18

## 2025-05-23 ASSESSMENT — ENCOUNTER SYMPTOMS
CONSTITUTIONAL NEGATIVE: 1
OCCASIONAL FEELINGS OF UNSTEADINESS: 0
GASTROINTESTINAL NEGATIVE: 1
DEPRESSION: 0
LOSS OF SENSATION IN FEET: 0
RESPIRATORY NEGATIVE: 1
PALPITATIONS: 1

## 2025-05-23 ASSESSMENT — COLUMBIA-SUICIDE SEVERITY RATING SCALE - C-SSRS
2. HAVE YOU ACTUALLY HAD ANY THOUGHTS OF KILLING YOURSELF?: NO
1. IN THE PAST MONTH, HAVE YOU WISHED YOU WERE DEAD OR WISHED YOU COULD GO TO SLEEP AND NOT WAKE UP?: NO
6. HAVE YOU EVER DONE ANYTHING, STARTED TO DO ANYTHING, OR PREPARED TO DO ANYTHING TO END YOUR LIFE?: NO

## 2025-05-23 ASSESSMENT — PATIENT HEALTH QUESTIONNAIRE - PHQ9
2. FEELING DOWN, DEPRESSED OR HOPELESS: NOT AT ALL
SUM OF ALL RESPONSES TO PHQ9 QUESTIONS 1 AND 2: 0
1. LITTLE INTEREST OR PLEASURE IN DOING THINGS: NOT AT ALL

## 2025-05-23 ASSESSMENT — PAIN SCALES - GENERAL: PAINLEVEL_OUTOF10: 4

## 2025-05-23 NOTE — PATIENT INSTRUCTIONS
Stop rosuvastatin  Start pravastatin  Continue cymbalta  Start PT and follow up with ophthalmology.  Return when you get back to [place]

## 2025-05-23 NOTE — PROGRESS NOTES
"Subjective     Patient ID: Bassem Peacock is a 59 y.o. male who presents for Follow-up.    HPI  Bassem Peacock is seen for his chronic issues.        DM II  - Recently started glipizide XL 2.5 mg per day given diabetes dx. No side effects. HGBA1C was 6.6. Albumin/creatinine ratio pending.  - Referred to ophthalmology  - Referred to nutritionist.     GERD  - Trial of protonix at last appointment  - Improved.    DDD/OA of entire spine  - Referred to spine and PT.    Neuropathic pain-  - Start on cymbalta 20 mg at last appointment. Some mild Improvement, pt reports being more emotional than usual. Offered to increase dose. Patient is going out of town for seven weeks, he wants to keep the dose the same for now.     Review of Systems   Constitutional: Negative.    HENT: Negative.     Respiratory: Negative.     Cardiovascular:  Positive for palpitations (only from rousvastatin. Stopped when he stopped the med). Negative for chest pain and leg swelling.   Gastrointestinal: Negative.        Objective  Vitals:  /80   Pulse 82   Temp 36.5 °C (97.7 °F)   Ht 1.981 m (6' 6\")   Wt 140 kg (309 lb)   SpO2 98%   BMI 35.71 kg/m²     Physical Exam  Vitals and nursing note reviewed.   Constitutional:       General: He is not in acute distress.     Appearance: Normal appearance. He is obese. He is not ill-appearing, toxic-appearing or diaphoretic.   HENT:      Head: Normocephalic.      Right Ear: External ear normal.      Left Ear: External ear normal.      Nose: Nose normal. No congestion.   Eyes:      General:         Right eye: No discharge.         Left eye: No discharge.      Extraocular Movements: Extraocular movements intact.      Conjunctiva/sclera: Conjunctivae normal.      Pupils: Pupils are equal, round, and reactive to light.   Cardiovascular:      Rate and Rhythm: Normal rate and regular rhythm.      Pulses: Normal pulses.      Heart sounds: Normal heart sounds.   Pulmonary:      Effort: Pulmonary effort is normal. "   Abdominal:      Palpations: Abdomen is soft.   Skin:     General: Skin is warm.      Capillary Refill: Capillary refill takes less than 2 seconds.      Coloration: Skin is not jaundiced or pale.      Findings: No bruising, erythema, lesion or rash.   Neurological:      General: No focal deficit present.      Mental Status: He is alert and oriented to person, place, and time.      Cranial Nerves: No cranial nerve deficit.      Sensory: No sensory deficit.      Motor: No weakness.      Coordination: Coordination normal.      Gait: Gait normal.   Psychiatric:         Mood and Affect: Mood normal.         Behavior: Behavior normal.         Thought Content: Thought content normal.         Judgment: Judgment normal.       UA: Small protein otherwise WNL  CBC:   Lab Results   Component Value Date    WBC 9.6 04/01/2025    RBC 4.84 04/01/2025    HGB 14.8 04/01/2025    HCT 43.1 04/01/2025    MCV 89 04/01/2025    MCH 30.6 04/01/2025    MCHC 34.3 04/01/2025    RDWS 46.9 01/26/2023    RDWC 14.4 01/26/2023     04/01/2025    MPV 10.9 01/26/2023       CBC w/Diff:   Lab Results   Component Value Date    WBC 9.6 04/01/2025    NRBC 0.0 04/01/2025    RBC 4.84 04/01/2025    HGB 14.8 04/01/2025    HCT 43.1 04/01/2025    MCV 89 04/01/2025    MCHC 34.3 04/01/2025     04/01/2025    RDW 13.5 04/01/2025    NEUTOPHILPCT 55.1 04/01/2025    IGPCT 0.2 04/01/2025    LYMPHOPCT 33.5 04/01/2025    MONOPCT 8.9 04/01/2025    EOSPCT 1.9 04/01/2025    BASOPCT 0.4 04/01/2025    NEUTROABS 5.29 04/01/2025    LYMPHSABS 3.21 04/01/2025    MONOSABS 0.85 04/01/2025    EOSABS 0.18 04/01/2025    BASOSABS 0.04 04/01/2025        CMP:  Lab Results   Component Value Date    GLUCOSE 130 (H) 04/01/2025     04/01/2025    K 3.5 04/01/2025     04/01/2025    CO2 24 04/01/2025    ANIONGAP 15 04/01/2025    BUN 11 04/01/2025    CREATININE 1.02 04/01/2025    CALCIUM 8.9 04/01/2025    ALBUMIN 4.3 04/01/2025    ALKPHOS 48 04/01/2025    PROT 7.4  "04/01/2025    AST 21 04/01/2025    BILITOT 0.5 04/01/2025    ALT 24 04/01/2025        BMP:  Lab Results   Component Value Date    GLUCOSE 130 (H) 04/01/2025    BUN 11 04/01/2025    CREATININE 1.02 04/01/2025    UREACREAUR 11.0 01/26/2023     04/01/2025    K 3.5 04/01/2025     04/01/2025    CO2 24 04/01/2025    ANIONGAP 15 04/01/2025    CALCIUM 8.9 04/01/2025    EGFR 85 04/01/2025        Lipid:   Lab Results   Component Value Date    CHOL 142 04/25/2025    HDL 37 (L) 04/25/2025    CHHDL 3.8 04/25/2025    LDLCALC 86 04/25/2025    TRIG 101 04/25/2025       LDL Direct:  No results found for: \"LDLDIRECT\"     TSH:   Lab Results   Component Value Date    TSH 1.71 04/25/2025        B12:  No results found for: \"UWGATTMP83\"    Vitamin D:  No results found for: \"VITD25\"     HgA1c:   Lab Results   Component Value Date    HGBA1C 6.6 (H) 04/25/2025    MTXEDBRA8P 143 04/25/2025       PSA:   Lab Results   Component Value Date    PSA 0.35 04/25/2025       FreeT4:  No results found for: \"FREET4\"    T3:   No results found for: \"T3FREE\"        XR lumbar spine complete 4+ views  Narrative: Interpreted By:  Diana Stockton,   STUDY:  Lumbar Spine, 5 views.      INDICATION:  Signs/Symptoms:pain.      COMPARISON:  None.      ACCESSION NUMBER(S):  [id number]      ORDERING CLINICIAN:  MAKI LOCKWOOD      FINDINGS:  Grade 1 L5-S1 anterolisthesis secondary to chronic L5 pars defects.  Moderate L5-S1 spondylosis with disc height loss and osteophytes.  Mild L4-5 and moderate L5-S1 facet arthropathy.  Mild L2-3 spondylosis with osteophytes.      No spondylolysis on the oblique views.  Vertebral body heights are preserved. Posterior elements are intact.      Impression: 1. Advanced L5-S1 degenerative changes with grade 1 anterolisthesis  and chronic L5 pars defects.      MACRO:  None.      Signed by: Diana Stockton 4/27/2025 9:52 AM  Dictation workstation:   [id number]  XR thoracic spine 3 views  Narrative: Interpreted By:  " Diana Stockton,   STUDY:  Thoracic spine, four views.      INDICATION:  Signs/Symptoms:pain.      COMPARISON:  None.      ACCESSION NUMBER(S):  [id number]      ORDERING CLINICIAN:  MAKI LOCKWOOD      FINDINGS:  No malalignment.  Mild lower thoracic spine spondylosis from T9-T12 with disc height  loss and osteophytes.          Vertebral body heights are well maintained. Posterior elements appear  to be intact.      Impression: No acute thoracic spine abnormality.  Mild lower thoracic spondylosis from T9-T12.      MACRO:  None.      Signed by: Diana Stockton 4/27/2025 9:51 AM  Dictation workstation:   [id number]  XR cervical spine complete 4-5 views  Narrative: Interpreted By:  Diana Stockton,   STUDY:  Cervical spine, 4 views.      INDICATION:  Signs/Symptoms:pain.      COMPARISON:  None.      ACCESSION NUMBER(S):  [id number]      ORDERING CLINICIAN:  MAKI LOCKWOOD      FINDINGS:  Alignment is within normal limits.  Moderate C6-7 and mild C4-5/C5-6 spondylosis with disc height loss  and osteophytes. Vertebral body heights are preserved. Posterior  elements are intact. On the left, mild C5-6 and C6-7 foraminal  stenosis. Prevertebral soft tissues are unremarkable.      Impression: 1. As above.      MACRO:  None.      Signed by: Diana Stockton 4/27/2025 9:50 AM  Dictation workstation:   [id number]       1. Type 2 diabetes mellitus without complication, without long-term current use of insulin  Albumin-Creatinine Ratio, Urine Random             Orders Placed This Encounter   Procedures    Albumin-Creatinine Ratio, Urine Random     Release result to MyChart:   Immediate [1]       1. Type 2 diabetes mellitus without complication, without long-term current use of insulin  Albumin-Creatinine Ratio, Urine Random    pravastatin (Pravachol) 10 mg tablet   Side effect to crestor. Trial pravastatin 10 mg/day DULoxetine (Cymbalta) 20 mg DR capsule    glipiZIDE XL (Glucotrol XL) 2.5 mg 24 hr tablet      2.  Neuropathic pain  Disability Placard   Referral to spine/PT placed prior DULoxetine (Cymbalta) 20 mg DR capsule      3. Spinal pain  Disability Placard   Referral to spine/PT placed prior   4. Degeneration of intervertebral disc of lumbar region, unspecified whether pain present  Disability Placard      5. Gastroesophageal reflux disease, unspecified whether esophagitis present  Disability Placard   Continue protonix   6. Bilateral back pain, unspecified back location, unspecified chronicity  POCT UA Automated manually resulted   As above   7. Chronic low back pain, unspecified back pain laterality, unspecified whether sciatica present     As above   8. Dark yellow-colored urine  POCT UA Automated manually resulted   More water consumption.     Stop rosuvastatin  Start pravastatin  Continue cymbalta  Start PT and follow up with ophthalmology.  Return when you get back to [place]    This note was partially generated using the Dragon Voice recognition system. There may be some incorrect wording, spelling and/or spelling errors or punctuation errors that were not corrected prior to committing the note to the medical record.

## 2025-05-24 LAB
ALBUMIN/CREAT UR: 4 MG/G CREAT
CREAT UR-MCNC: 230 MG/DL (ref 20–320)
MICROALBUMIN UR-MCNC: 1 MG/DL

## 2025-06-18 DIAGNOSIS — K21.9 GASTROESOPHAGEAL REFLUX DISEASE, UNSPECIFIED WHETHER ESOPHAGITIS PRESENT: ICD-10-CM

## 2025-06-18 DIAGNOSIS — R10.84 GENERALIZED ABDOMINAL PAIN: ICD-10-CM

## 2025-06-19 RX ORDER — PANTOPRAZOLE SODIUM 40 MG/1
TABLET, DELAYED RELEASE ORAL
Qty: 30 TABLET | Refills: 0 | Status: SHIPPED | OUTPATIENT
Start: 2025-06-19

## 2025-07-18 DIAGNOSIS — K21.9 GASTROESOPHAGEAL REFLUX DISEASE, UNSPECIFIED WHETHER ESOPHAGITIS PRESENT: ICD-10-CM

## 2025-07-18 DIAGNOSIS — R10.84 GENERALIZED ABDOMINAL PAIN: ICD-10-CM

## 2025-07-18 RX ORDER — PANTOPRAZOLE SODIUM 40 MG/1
TABLET, DELAYED RELEASE ORAL
Qty: 90 TABLET | Refills: 1 | Status: SHIPPED | OUTPATIENT
Start: 2025-07-18

## 2025-07-22 DIAGNOSIS — R21 RASH: ICD-10-CM

## 2025-07-22 RX ORDER — DESONIDE 0.5 MG/G
CREAM TOPICAL 2 TIMES DAILY
Qty: 15 G | Refills: 1 | Status: SHIPPED | OUTPATIENT
Start: 2025-07-22 | End: 2025-07-25 | Stop reason: SDUPTHER

## 2025-07-23 ENCOUNTER — TELEPHONE (OUTPATIENT)
Dept: GASTROENTEROLOGY | Facility: HOSPITAL | Age: 60
End: 2025-07-23
Payer: MEDICARE

## 2025-07-23 NOTE — TELEPHONE ENCOUNTER
Called to inform patient procedure was rescheduled to 12/23, vm full no message left. [employer] message sent as well.

## 2025-07-25 ENCOUNTER — OFFICE VISIT (OUTPATIENT)
Dept: PRIMARY CARE | Facility: CLINIC | Age: 60
End: 2025-07-25
Payer: MEDICARE

## 2025-07-25 VITALS
HEIGHT: 78 IN | TEMPERATURE: 96.9 F | BODY MASS INDEX: 34.76 KG/M2 | SYSTOLIC BLOOD PRESSURE: 134 MMHG | HEART RATE: 80 BPM | DIASTOLIC BLOOD PRESSURE: 82 MMHG | WEIGHT: 300.4 LBS | OXYGEN SATURATION: 97 %

## 2025-07-25 DIAGNOSIS — M79.2 NEUROPATHIC PAIN: Primary | ICD-10-CM

## 2025-07-25 DIAGNOSIS — R21 RASH: ICD-10-CM

## 2025-07-25 DIAGNOSIS — E11.9 TYPE 2 DIABETES MELLITUS WITHOUT COMPLICATION, WITHOUT LONG-TERM CURRENT USE OF INSULIN: ICD-10-CM

## 2025-07-25 DIAGNOSIS — Z78.9 STATIN INTOLERANCE: ICD-10-CM

## 2025-07-25 PROCEDURE — 3008F BODY MASS INDEX DOCD: CPT | Performed by: PHYSICIAN ASSISTANT

## 2025-07-25 PROCEDURE — 3079F DIAST BP 80-89 MM HG: CPT | Performed by: PHYSICIAN ASSISTANT

## 2025-07-25 PROCEDURE — 3075F SYST BP GE 130 - 139MM HG: CPT | Performed by: PHYSICIAN ASSISTANT

## 2025-07-25 PROCEDURE — 1036F TOBACCO NON-USER: CPT | Performed by: PHYSICIAN ASSISTANT

## 2025-07-25 PROCEDURE — G2211 COMPLEX E/M VISIT ADD ON: HCPCS | Performed by: PHYSICIAN ASSISTANT

## 2025-07-25 PROCEDURE — 99214 OFFICE O/P EST MOD 30 MIN: CPT | Performed by: PHYSICIAN ASSISTANT

## 2025-07-25 RX ORDER — DESONIDE 0.5 MG/G
CREAM TOPICAL 2 TIMES DAILY
Qty: 60 G | Refills: 1 | Status: SHIPPED | OUTPATIENT
Start: 2025-07-25 | End: 2026-07-25

## 2025-07-25 ASSESSMENT — PATIENT HEALTH QUESTIONNAIRE - PHQ9
SUM OF ALL RESPONSES TO PHQ9 QUESTIONS 1 AND 2: 0
1. LITTLE INTEREST OR PLEASURE IN DOING THINGS: NOT AT ALL
2. FEELING DOWN, DEPRESSED OR HOPELESS: NOT AT ALL

## 2025-07-25 ASSESSMENT — ENCOUNTER SYMPTOMS
LOSS OF SENSATION IN FEET: 1
CONSTITUTIONAL NEGATIVE: 1
DEPRESSION: 0
WOUND: 0
OCCASIONAL FEELINGS OF UNSTEADINESS: 0
COLOR CHANGE: 0
CARDIOVASCULAR NEGATIVE: 1
NEUROLOGICAL NEGATIVE: 1
ALLERGIC/IMMUNOLOGIC NEGATIVE: 1
EYES NEGATIVE: 1
ENDOCRINE NEGATIVE: 1
PSYCHIATRIC NEGATIVE: 1
RESPIRATORY NEGATIVE: 1
GASTROINTESTINAL NEGATIVE: 1
MUSCULOSKELETAL NEGATIVE: 1
HEMATOLOGIC/LYMPHATIC NEGATIVE: 1

## 2025-07-25 ASSESSMENT — PAIN SCALES - GENERAL: PAINLEVEL_OUTOF10: 5

## 2025-07-25 NOTE — PATIENT INSTRUCTIONS
Get fasting bloodwork 1 week prior to appointment.    Return to clinic in 3 months, sooner as needed    If symptoms severely worsen or you experience any new concerning symptoms, go to the nearest emergency room or call 911 as needed.

## 2025-07-25 NOTE — PROGRESS NOTES
"Subjective     Patient ID: Bassem Peacock is a 59 y.o. male who presents for Follow-up (Med Review).    HPI  Bassem Peacock is seen for his chronic issues.      HLD  - Was prescribed pravastatin as he had intolerance to rosuvastatin.  Patient reports that pravastatin gave him difficulty sleeping and overall discomfort.  He stopped taking the medication.  Given this he has tried multiple successive statins and failed therapy.  Will check lipid panel in 3 months at which time a recommend Repatha or similar pending cardiovascular risk factors.    Neuropathic pain  - Patient is happy on Cymbalta 20 mg.  He does not want any changes at this time.    Eczema  - Patient requesting refill of desonide, this will be granted.  As he is going to [place] for an extended period time we will increase size of tube from 15 g to 60 g  - Discussed [place] or [facility] [facility] for further follow-up.  Review of Systems   Constitutional: Negative.    HENT: Negative.     Eyes: Negative.    Respiratory: Negative.     Cardiovascular: Negative.    Gastrointestinal: Negative.    Endocrine: Negative.    Genitourinary: Negative.    Musculoskeletal: Negative.    Skin:  Positive for rash. Negative for color change, pallor and wound.   Allergic/Immunologic: Negative.    Neurological: Negative.    Hematological: Negative.    Psychiatric/Behavioral: Negative.         Objective  Vitals:  /82 (BP Location: Right arm, Patient Position: Sitting, BP Cuff Size: Large adult)   Pulse 80   Temp 36.1 °C (96.9 °F)   Ht (!) 1.981 m (6' 6\")   Wt 136 kg (300 lb 6.4 oz)   SpO2 97%   BMI 34.71 kg/m²     Physical Exam  Vitals and nursing note reviewed.   Constitutional:       General: He is not in acute distress.     Appearance: Normal appearance. He is obese. He is not ill-appearing, toxic-appearing or diaphoretic.   HENT:      Head: Normocephalic.      Right Ear: External ear normal.      Left Ear: External ear normal.      Nose: Nose normal. No congestion. "     Eyes:      General:         Right eye: No discharge.         Left eye: No discharge.      Extraocular Movements: Extraocular movements intact.      Conjunctiva/sclera: Conjunctivae normal.      Pupils: Pupils are equal, round, and reactive to light.       Cardiovascular:      Rate and Rhythm: Normal rate and regular rhythm.      Pulses: Normal pulses.      Heart sounds: Normal heart sounds.   Pulmonary:      Effort: Pulmonary effort is normal.   Abdominal:      Palpations: Abdomen is soft.     Skin:     General: Skin is warm.      Capillary Refill: Capillary refill takes less than 2 seconds.      Coloration: Skin is not jaundiced or pale.      Findings: Rash present. No bruising, erythema or lesion.      Comments: Ezcema       Neurological:      General: No focal deficit present.      Mental Status: He is alert and oriented to person, place, and time.      Cranial Nerves: No cranial nerve deficit.      Sensory: No sensory deficit.      Motor: No weakness.      Coordination: Coordination normal.      Gait: Gait normal.     Psychiatric:         Mood and Affect: Mood normal.         Behavior: Behavior normal.         Thought Content: Thought content normal.         Judgment: Judgment normal.       CBC:   Lab Results   Component Value Date    WBC 9.6 04/01/2025    RBC 4.84 04/01/2025    HGB 14.8 04/01/2025    HCT 43.1 04/01/2025    MCV 89 04/01/2025    MCH 30.6 04/01/2025    MCHC 34.3 04/01/2025    RDWS 46.9 01/26/2023    RDWC 14.4 01/26/2023     04/01/2025    MPV 10.9 01/26/2023       CBC w/Diff:   Lab Results   Component Value Date    WBC 9.6 04/01/2025    NRBC 0.0 04/01/2025    RBC 4.84 04/01/2025    HGB 14.8 04/01/2025    HCT 43.1 04/01/2025    MCV 89 04/01/2025    MCHC 34.3 04/01/2025     04/01/2025    RDW 13.5 04/01/2025    NEUTOPHILPCT 55.1 04/01/2025    IGPCT 0.2 04/01/2025    LYMPHOPCT 33.5 04/01/2025    MONOPCT 8.9 04/01/2025    EOSPCT 1.9 04/01/2025    BASOPCT 0.4 04/01/2025    RICHARDOABS  "5.29 04/01/2025    LYMPHSABS 3.21 04/01/2025    MONOSABS 0.85 04/01/2025    EOSABS 0.18 04/01/2025    BASOSABS 0.04 04/01/2025        CMP:  Lab Results   Component Value Date    GLUCOSE 130 (H) 04/01/2025     04/01/2025    K 3.5 04/01/2025     04/01/2025    CO2 24 04/01/2025    ANIONGAP 15 04/01/2025    BUN 11 04/01/2025    CREATININE 1.02 04/01/2025    CALCIUM 8.9 04/01/2025    ALBUMIN 4.3 04/01/2025    ALKPHOS 48 04/01/2025    PROT 7.4 04/01/2025    AST 21 04/01/2025    BILITOT 0.5 04/01/2025    ALT 24 04/01/2025        BMP:  Lab Results   Component Value Date    GLUCOSE 130 (H) 04/01/2025    BUN 11 04/01/2025    CREATININE 1.02 04/01/2025    UREACREAUR 11.0 01/26/2023     04/01/2025    K 3.5 04/01/2025     04/01/2025    CO2 24 04/01/2025    ANIONGAP 15 04/01/2025    CALCIUM 8.9 04/01/2025    EGFR 85 04/01/2025        Lipid:   Lab Results   Component Value Date    CHOL 142 04/25/2025    HDL 37 (L) 04/25/2025    CHHDL 3.8 04/25/2025    LDLCALC 86 04/25/2025    TRIG 101 04/25/2025       LDL Direct:  No results found for: \"LDLDIRECT\"     TSH:   Lab Results   Component Value Date    TSH 1.71 04/25/2025        B12:  No results found for: \"[id number]\"    Vitamin D:  No results found for: \"VITD25\"     HgA1c:   Lab Results   Component Value Date    HGBA1C 6.6 (H) 04/25/2025    [id number] 143 04/25/2025       PSA:   Lab Results   Component Value Date    PSA 0.35 04/25/2025       FreeT4:  No results found for: \"FREET4\"    T3:   No results found for: \"T3FREE\"              1. Neuropathic pain     Controlled on Cymbalta 20 mg/day   2. Rash  desonide (DesOwen) 0.05 % cream   Ezcema   3. Statin intolerance     Recheck FLP in three months   4. Type 2 diabetes mellitus without complication, without long-term current use of insulin  Lipid panel   Recheck in three months. Continue current medications Comprehensive metabolic panel    Hemoglobin A1c           Get fasting bloodwork 1 week prior to " appointment.    Return to clinic in 3 months, sooner as needed    If symptoms severely worsen or you experience any new concerning symptoms, go to the nearest emergency room or call 911 as needed.

## 2025-07-30 ENCOUNTER — TELEPHONE (OUTPATIENT)
Dept: GASTROENTEROLOGY | Facility: HOSPITAL | Age: 60
End: 2025-07-30
Payer: MEDICARE

## 2025-09-16 ENCOUNTER — APPOINTMENT (OUTPATIENT)
Dept: GASTROENTEROLOGY | Facility: HOSPITAL | Age: 60
End: 2025-09-16
Payer: MEDICARE